# Patient Record
Sex: FEMALE | Race: WHITE | NOT HISPANIC OR LATINO | Employment: FULL TIME | URBAN - METROPOLITAN AREA
[De-identification: names, ages, dates, MRNs, and addresses within clinical notes are randomized per-mention and may not be internally consistent; named-entity substitution may affect disease eponyms.]

---

## 2022-12-10 ENCOUNTER — HOSPITAL ENCOUNTER (EMERGENCY)
Facility: HOSPITAL | Age: 42
Discharge: HOME | End: 2022-12-11
Attending: EMERGENCY MEDICINE | Admitting: EMERGENCY MEDICINE

## 2022-12-10 ENCOUNTER — APPOINTMENT (OUTPATIENT)
Dept: GENERAL RADIOLOGY | Facility: HOSPITAL | Age: 42
End: 2022-12-10

## 2022-12-10 ENCOUNTER — APPOINTMENT (OUTPATIENT)
Dept: CT IMAGING | Facility: HOSPITAL | Age: 42
End: 2022-12-10

## 2022-12-10 DIAGNOSIS — K30 ACID INDIGESTION: ICD-10-CM

## 2022-12-10 DIAGNOSIS — R11.0 NAUSEA: ICD-10-CM

## 2022-12-10 DIAGNOSIS — R13.19 ESOPHAGEAL DYSPHAGIA: ICD-10-CM

## 2022-12-10 DIAGNOSIS — K21.9 GASTROESOPHAGEAL REFLUX DISEASE, UNSPECIFIED WHETHER ESOPHAGITIS PRESENT: ICD-10-CM

## 2022-12-10 DIAGNOSIS — Z72.0 TOBACCO ABUSE: ICD-10-CM

## 2022-12-10 DIAGNOSIS — R10.10 UPPER ABDOMINAL PAIN: Primary | ICD-10-CM

## 2022-12-10 LAB
ALBUMIN SERPL-MCNC: 4.2 G/DL (ref 3.5–5.2)
ALBUMIN/GLOB SERPL: 1.5 G/DL
ALP SERPL-CCNC: 55 U/L (ref 39–117)
ALT SERPL W P-5'-P-CCNC: 11 U/L (ref 1–33)
ANION GAP SERPL CALCULATED.3IONS-SCNC: 13 MMOL/L (ref 5–15)
AST SERPL-CCNC: 15 U/L (ref 1–32)
B-HCG UR QL: NEGATIVE
BACTERIA UR QL AUTO: NORMAL /HPF
BASOPHILS # BLD AUTO: 0.04 10*3/MM3 (ref 0–0.2)
BASOPHILS NFR BLD AUTO: 0.5 % (ref 0–1.5)
BILIRUB SERPL-MCNC: 0.4 MG/DL (ref 0–1.2)
BILIRUB UR QL STRIP: NEGATIVE
BUN SERPL-MCNC: 7 MG/DL (ref 6–20)
BUN/CREAT SERPL: 12.7 (ref 7–25)
CALCIUM SPEC-SCNC: 9.5 MG/DL (ref 8.6–10.5)
CHLORIDE SERPL-SCNC: 103 MMOL/L (ref 98–107)
CLARITY UR: ABNORMAL
CO2 SERPL-SCNC: 24 MMOL/L (ref 22–29)
COLOR UR: YELLOW
CREAT SERPL-MCNC: 0.55 MG/DL (ref 0.57–1)
DEPRECATED RDW RBC AUTO: 40.3 FL (ref 37–54)
EGFRCR SERPLBLD CKD-EPI 2021: 118.3 ML/MIN/1.73
EOSINOPHIL # BLD AUTO: 0.05 10*3/MM3 (ref 0–0.4)
EOSINOPHIL NFR BLD AUTO: 0.7 % (ref 0.3–6.2)
ERYTHROCYTE [DISTWIDTH] IN BLOOD BY AUTOMATED COUNT: 11.7 % (ref 12.3–15.4)
FLUAV RNA RESP QL NAA+PROBE: NOT DETECTED
FLUBV RNA RESP QL NAA+PROBE: NOT DETECTED
GLOBULIN UR ELPH-MCNC: 2.8 GM/DL
GLUCOSE SERPL-MCNC: 94 MG/DL (ref 65–99)
GLUCOSE UR STRIP-MCNC: NEGATIVE MG/DL
HCT VFR BLD AUTO: 38.9 % (ref 34–46.6)
HGB BLD-MCNC: 13.2 G/DL (ref 12–15.9)
HGB UR QL STRIP.AUTO: NEGATIVE
HYALINE CASTS UR QL AUTO: NORMAL /LPF
IMM GRANULOCYTES # BLD AUTO: 0.02 10*3/MM3 (ref 0–0.05)
IMM GRANULOCYTES NFR BLD AUTO: 0.3 % (ref 0–0.5)
KETONES UR QL STRIP: ABNORMAL
LEUKOCYTE ESTERASE UR QL STRIP.AUTO: NEGATIVE
LIPASE SERPL-CCNC: 23 U/L (ref 13–60)
LYMPHOCYTES # BLD AUTO: 1.95 10*3/MM3 (ref 0.7–3.1)
LYMPHOCYTES NFR BLD AUTO: 26.4 % (ref 19.6–45.3)
MCH RBC QN AUTO: 32.3 PG (ref 26.6–33)
MCHC RBC AUTO-ENTMCNC: 33.9 G/DL (ref 31.5–35.7)
MCV RBC AUTO: 95.1 FL (ref 79–97)
MONOCYTES # BLD AUTO: 0.59 10*3/MM3 (ref 0.1–0.9)
MONOCYTES NFR BLD AUTO: 8 % (ref 5–12)
NEUTROPHILS NFR BLD AUTO: 4.73 10*3/MM3 (ref 1.7–7)
NEUTROPHILS NFR BLD AUTO: 64.1 % (ref 42.7–76)
NITRITE UR QL STRIP: NEGATIVE
NRBC BLD AUTO-RTO: 0 /100 WBC (ref 0–0.2)
PH UR STRIP.AUTO: 5.5 [PH] (ref 5–8)
PLATELET # BLD AUTO: 160 10*3/MM3 (ref 140–450)
PMV BLD AUTO: 11.1 FL (ref 6–12)
POTASSIUM SERPL-SCNC: 3.7 MMOL/L (ref 3.5–5.2)
PROT SERPL-MCNC: 7 G/DL (ref 6–8.5)
PROT UR QL STRIP: NEGATIVE
RBC # BLD AUTO: 4.09 10*6/MM3 (ref 3.77–5.28)
RBC # UR STRIP: NORMAL /HPF
REF LAB TEST METHOD: NORMAL
SARS-COV-2 RNA RESP QL NAA+PROBE: NOT DETECTED
SODIUM SERPL-SCNC: 140 MMOL/L (ref 136–145)
SP GR UR STRIP: 1.01 (ref 1–1.03)
SQUAMOUS #/AREA URNS HPF: NORMAL /HPF
TROPONIN T SERPL-MCNC: <0.01 NG/ML (ref 0–0.03)
UROBILINOGEN UR QL STRIP: ABNORMAL
WBC # UR STRIP: NORMAL /HPF
WBC NRBC COR # BLD: 7.38 10*3/MM3 (ref 3.4–10.8)

## 2022-12-10 PROCEDURE — 81001 URINALYSIS AUTO W/SCOPE: CPT | Performed by: PHYSICIAN ASSISTANT

## 2022-12-10 PROCEDURE — 83690 ASSAY OF LIPASE: CPT | Performed by: PHYSICIAN ASSISTANT

## 2022-12-10 PROCEDURE — 99283 EMERGENCY DEPT VISIT LOW MDM: CPT

## 2022-12-10 PROCEDURE — 25010000002 IOPAMIDOL 61 % SOLUTION: Performed by: EMERGENCY MEDICINE

## 2022-12-10 PROCEDURE — 74177 CT ABD & PELVIS W/CONTRAST: CPT

## 2022-12-10 PROCEDURE — 80053 COMPREHEN METABOLIC PANEL: CPT | Performed by: PHYSICIAN ASSISTANT

## 2022-12-10 PROCEDURE — 87636 SARSCOV2 & INF A&B AMP PRB: CPT | Performed by: PHYSICIAN ASSISTANT

## 2022-12-10 PROCEDURE — 86677 HELICOBACTER PYLORI ANTIBODY: CPT | Performed by: PHYSICIAN ASSISTANT

## 2022-12-10 PROCEDURE — 85025 COMPLETE CBC W/AUTO DIFF WBC: CPT | Performed by: PHYSICIAN ASSISTANT

## 2022-12-10 PROCEDURE — 71045 X-RAY EXAM CHEST 1 VIEW: CPT

## 2022-12-10 PROCEDURE — 81025 URINE PREGNANCY TEST: CPT | Performed by: EMERGENCY MEDICINE

## 2022-12-10 PROCEDURE — 96374 THER/PROPH/DIAG INJ IV PUSH: CPT

## 2022-12-10 PROCEDURE — 84484 ASSAY OF TROPONIN QUANT: CPT | Performed by: PHYSICIAN ASSISTANT

## 2022-12-10 PROCEDURE — 93005 ELECTROCARDIOGRAM TRACING: CPT | Performed by: PHYSICIAN ASSISTANT

## 2022-12-10 RX ORDER — SODIUM CHLORIDE 0.9 % (FLUSH) 0.9 %
10 SYRINGE (ML) INJECTION AS NEEDED
Status: DISCONTINUED | OUTPATIENT
Start: 2022-12-10 | End: 2022-12-11 | Stop reason: HOSPADM

## 2022-12-10 RX ORDER — ALUMINA, MAGNESIA, AND SIMETHICONE 2400; 2400; 240 MG/30ML; MG/30ML; MG/30ML
15 SUSPENSION ORAL ONCE
Status: COMPLETED | OUTPATIENT
Start: 2022-12-10 | End: 2022-12-10

## 2022-12-10 RX ORDER — PANTOPRAZOLE SODIUM 40 MG/10ML
40 INJECTION, POWDER, LYOPHILIZED, FOR SOLUTION INTRAVENOUS ONCE
Status: COMPLETED | OUTPATIENT
Start: 2022-12-10 | End: 2022-12-10

## 2022-12-10 RX ORDER — LIDOCAINE HYDROCHLORIDE 20 MG/ML
15 SOLUTION OROPHARYNGEAL ONCE
Status: COMPLETED | OUTPATIENT
Start: 2022-12-10 | End: 2022-12-10

## 2022-12-10 RX ADMIN — PANTOPRAZOLE SODIUM 40 MG: 40 INJECTION, POWDER, FOR SOLUTION INTRAVENOUS at 21:59

## 2022-12-10 RX ADMIN — IOPAMIDOL 85 ML: 612 INJECTION, SOLUTION INTRAVENOUS at 21:31

## 2022-12-10 RX ADMIN — LIDOCAINE HYDROCHLORIDE 15 ML: 20 SOLUTION ORAL at 22:14

## 2022-12-10 RX ADMIN — ALUMINUM HYDROXIDE, MAGNESIUM HYDROXIDE, AND DIMETHICONE 15 ML: 400; 400; 40 SUSPENSION ORAL at 22:14

## 2022-12-11 VITALS
DIASTOLIC BLOOD PRESSURE: 70 MMHG | HEART RATE: 69 BPM | RESPIRATION RATE: 20 BRPM | OXYGEN SATURATION: 96 % | SYSTOLIC BLOOD PRESSURE: 108 MMHG | BODY MASS INDEX: 22.15 KG/M2 | HEIGHT: 63 IN | TEMPERATURE: 97.9 F | WEIGHT: 125 LBS

## 2022-12-11 NOTE — DISCHARGE INSTRUCTIONS
ER work-up is reassuring.  EKG and troponin were normal.  CBC, chemistries, urinalysis were also within normal limits.  H. pylori IgM studies are in process.  CT of the abdomen/pelvis with contrast revealed no acute process.  Patient needs to continue with Nexium, Pepcid, and Carafate as prescribed.  Follow-up next week with GI, Dr. Alanis, at [facility] as scheduled.  Avoid greasy, spicy, or fatty foods.  Avoid NSAIDs, alcohol.  Return to the ER if worsening symptoms.  Strongly recommend tobacco cessation.  Recommend GI soft diet to prevent any further difficulty swallowing.

## 2022-12-11 NOTE — ED PROVIDER NOTES
"Subjective   History of Present Illness  This is a 42-year-old female that presents the ER with recurrent epigastric and substernal burning, pressure, and throbbing for the last 3 months.  Patient says she is having \"choking\" episodes almost on a daily basis with eating.  She says that she was eating cauliflower and it got caught.  She was able to allow the candy to resolve.  She continues to have pressure, indigestion, discomfort, increased belching.  She was seen and evaluated at [facility] ER but says that they did not treat her well.  She has GI follow-up at [facility] next Thursday on 12/15/2022.  Patient reports nausea but says that she is hungry at the same time.  She denies any vomiting.  She reports 1 loose stool yesterday but has not had a bowel movement today.  Patient has never had EGD or colonoscopy.  Patient reports previous history of frequent NSAID use but says that she has not taken NSAIDs in several months.  She denies any alcohol use.  She reports smoking half pack of cigarettes per day.    History provided by:  Patient  Abdominal Pain  Pain location:  Epigastric  Pain quality: burning, pressure and throbbing    Pain radiates to:  Chest (SS chest)  Duration: 3 months.  Timing: Occuring at least once daily.  Chronicity:  Chronic  Context: not alcohol use    Context comment:  Pt having recurrent \"choking\" episodes almost on a daily basis with eating.  She reports episodes started in 9/22.  Last episode was 5 days ago eating cauliflower.  No prior EGD.  Pt is nauseated but hungry at the same time.  Just changed to Nexium PPI.  Relieved by:  Nothing  Exacerbated by: eating almost anything.  Ineffective treatments: Nexium and Pepcid AC.  Associated symptoms: chest pain (SS CP/pressure.), diarrhea (1 loose stool yesterday.) and nausea    Associated symptoms: no anorexia, no belching, no chills, no constipation, no cough, no dysuria, no fatigue, no fever, no flatus, no hematemesis, no " hematochezia, no hematuria, no shortness of breath, no sore throat, no vaginal bleeding, no vaginal discharge and no vomiting    Risk factors: no alcohol abuse    Risk factors comment:  No previous EGD or colonoscopy.  Patient has close follow-up with GI in [place] next Thursday.  Patient used to take increased amounts of NSAIDs, but she has not taken any NSAIDs in several months.      Review of Systems   Constitutional: Positive for appetite change. Negative for activity change, chills, fatigue and fever.   HENT: Negative.  Negative for sore throat.    Respiratory: Negative.  Negative for cough, chest tightness and shortness of breath.         Patient smokes half pack of cigarettes per day.   Cardiovascular: Positive for chest pain (SS CP/pressure.). Negative for palpitations and leg swelling.   Gastrointestinal: Positive for abdominal pain, diarrhea (1 loose stool yesterday.) and nausea. Negative for anorexia, constipation, flatus, hematemesis, hematochezia and vomiting.        Increased indigestion.  Increased belching.   Genitourinary: Negative.  Negative for dysuria, flank pain, frequency, hematuria, urgency, vaginal bleeding and vaginal discharge.        LMP: 2 weeks ago.     Musculoskeletal: Negative.  Negative for back pain.   Neurological: Negative.  Negative for dizziness, light-headedness and headaches.   All other systems reviewed and are negative.      No past medical history on file.    Allergies   Allergen Reactions   • Dilantin [Phenytoin] Hives       No past surgical history on file.    No family history on file.    Social History     Socioeconomic History   • Marital status: Single           Objective   Physical Exam  Vitals and nursing note reviewed.   Constitutional:       General: She is not in acute distress.     Appearance: Normal appearance. She is not ill-appearing, toxic-appearing or diaphoretic.      Comments: Thin build.  Strong tobacco odor noted.  Nontoxic.   HENT:      Head:  Normocephalic and atraumatic.      Nose: Nose normal.      Mouth/Throat:      Mouth: Mucous membranes are moist.      Pharynx: Oropharynx is clear.   Eyes:      Extraocular Movements: Extraocular movements intact.      Conjunctiva/sclera: Conjunctivae normal.      Pupils: Pupils are equal, round, and reactive to light.   Cardiovascular:      Rate and Rhythm: Normal rate and regular rhythm.  No extrasystoles are present.     Pulses: Normal pulses.      Heart sounds: Normal heart sounds.   Pulmonary:      Effort: Pulmonary effort is normal.      Breath sounds: Normal breath sounds.      Comments: Lungs are clear to auscultation bilaterally  Abdominal:      General: Bowel sounds are normal. There is no distension.      Palpations: Abdomen is soft.      Tenderness: There is abdominal tenderness in the epigastric area. There is no right CVA tenderness, left CVA tenderness, guarding or rebound.      Comments: Abdomen soft without distention.  Active bowel sounds in all 4 quadrants.  Moderate epigastric tenderness and mild substernal chest wall tenderness.  No rebound or guarding.  No flank or CVA tenderness.   Musculoskeletal:         General: Normal range of motion.      Cervical back: Normal range of motion and neck supple.      Right lower leg: No edema.      Left lower leg: No edema.   Skin:     General: Skin is warm and dry.   Neurological:      General: No focal deficit present.      Mental Status: She is alert.         Procedures           ED Course  ED Course as of 12/11/22 0034   Sun Dec 11, 2022   0032 EKG showed sinus rhythm.  No acute ST-T wave changes consistent with ischemia.  Chest x-ray showed no acute cardiopulmonary process.  CT of the abdomen/pelvis with contrast revealed no acute infectious or inflammatory process.  CBC, chemistries, and urinalysis were also within normal limits.  Patient tested negative for COVID-19 and influenza.  Urine pregnancy is negative.  Troponin was less than 0.010.  I ordered  H. pylori IgM, which is a send out study.  We gave IV Protonix as well as GI cocktail.  Patient has close GI follow-up with Dr. Alanis next week at [facility].  Recommend EGD in the near future.  Recommend GI soft diet.  Differential diagnoses includes GERD, H. pylori, or esophageal stricture.  Patient not having any difficulty handling secretions or drinking fluids.  She is ready for discharge to home. [FC]      ED Course User Index  [FC] Sonia Conti, DALE           Recent Results (from the past 24 hour(s))   Comprehensive Metabolic Panel    Collection Time: 12/10/22  7:53 PM    Specimen: Blood   Result Value Ref Range    Glucose 94 65 - 99 mg/dL    BUN 7 6 - 20 mg/dL    Creatinine 0.55 (L) 0.57 - 1.00 mg/dL    Sodium 140 136 - 145 mmol/L    Potassium 3.7 3.5 - 5.2 mmol/L    Chloride 103 98 - 107 mmol/L    CO2 24.0 22.0 - 29.0 mmol/L    Calcium 9.5 8.6 - 10.5 mg/dL    Total Protein 7.0 6.0 - 8.5 g/dL    Albumin 4.20 3.50 - 5.20 g/dL    ALT (SGPT) 11 1 - 33 U/L    AST (SGOT) 15 1 - 32 U/L    Alkaline Phosphatase 55 39 - 117 U/L    Total Bilirubin 0.4 0.0 - 1.2 mg/dL    Globulin 2.8 gm/dL    A/G Ratio 1.5 g/dL    BUN/Creatinine Ratio 12.7 7.0 - 25.0    Anion Gap 13.0 5.0 - 15.0 mmol/L    eGFR 118.3 >60.0 mL/min/1.73   Lipase    Collection Time: 12/10/22  7:53 PM    Specimen: Blood   Result Value Ref Range    Lipase 23 13 - 60 U/L   Urinalysis With Culture If Indicated - Urine, Clean Catch    Collection Time: 12/10/22  7:53 PM    Specimen: Urine, Clean Catch   Result Value Ref Range    Color, UA Yellow Yellow, Straw    Appearance, UA Cloudy (A) Clear    pH, UA 5.5 5.0 - 8.0    Specific Gravity, UA 1.008 1.001 - 1.030    Glucose, UA Negative Negative    Ketones, UA Trace (A) Negative    Bilirubin, UA Negative Negative    Blood, UA Negative Negative    Protein, UA Negative Negative    Leuk Esterase, UA Negative Negative    Nitrite, UA Negative Negative    Urobilinogen, UA 0.2 E.U./dL 0.2 - 1.0 E.U./dL    Troponin    Collection Time: 12/10/22  7:53 PM    Specimen: Blood   Result Value Ref Range    Troponin T <0.010 0.000 - 0.030 ng/mL   CBC Auto Differential    Collection Time: 12/10/22  7:53 PM    Specimen: Blood   Result Value Ref Range    WBC 7.38 3.40 - 10.80 10*3/mm3    RBC 4.09 3.77 - 5.28 10*6/mm3    Hemoglobin 13.2 12.0 - 15.9 g/dL    Hematocrit 38.9 34.0 - 46.6 %    MCV 95.1 79.0 - 97.0 fL    MCH 32.3 26.6 - 33.0 pg    MCHC 33.9 31.5 - 35.7 g/dL    RDW 11.7 (L) 12.3 - 15.4 %    RDW-SD 40.3 37.0 - 54.0 fl    MPV 11.1 6.0 - 12.0 fL    Platelets 160 140 - 450 10*3/mm3    Neutrophil % 64.1 42.7 - 76.0 %    Lymphocyte % 26.4 19.6 - 45.3 %    Monocyte % 8.0 5.0 - 12.0 %    Eosinophil % 0.7 0.3 - 6.2 %    Basophil % 0.5 0.0 - 1.5 %    Immature Grans % 0.3 0.0 - 0.5 %    Neutrophils, Absolute 4.73 1.70 - 7.00 10*3/mm3    Lymphocytes, Absolute 1.95 0.70 - 3.10 10*3/mm3    Monocytes, Absolute 0.59 0.10 - 0.90 10*3/mm3    Eosinophils, Absolute 0.05 0.00 - 0.40 10*3/mm3    Basophils, Absolute 0.04 0.00 - 0.20 10*3/mm3    Immature Grans, Absolute 0.02 0.00 - 0.05 10*3/mm3    nRBC 0.0 0.0 - 0.2 /100 WBC   Urinalysis, Microscopic Only - Urine, Clean Catch    Collection Time: 12/10/22  7:53 PM    Specimen: Urine, Clean Catch   Result Value Ref Range    RBC, UA 0-2 None Seen, 0-2 /HPF    WBC, UA 0-2 None Seen, 0-2 /HPF    Bacteria, UA Trace None Seen, Trace /HPF    Squamous Epithelial Cells, UA 0-2 None Seen, 0-2 /HPF    Hyaline Casts, UA 0-6 0 - 6 /LPF    Methodology Manual Light Microscopy    Pregnancy, Urine - Urine, Clean Catch    Collection Time: 12/10/22  7:53 PM    Specimen: Urine, Clean Catch   Result Value Ref Range    HCG, Urine QL Negative Negative   ECG 12 Lead Chest Pain    Collection Time: 12/10/22  8:29 PM   Result Value Ref Range    QT Interval 406 ms    QTC Interval 425 ms   COVID-19 and FLU A/B PCR - Swab, Nasopharynx    Collection Time: 12/10/22 10:29 PM    Specimen: Nasopharynx; Swab   Result Value Ref  Range    COVID19 Not Detected Not Detected - Ref. Range    Influenza A PCR Not Detected Not Detected    Influenza B PCR Not Detected Not Detected     Note: In addition to lab results from this visit, the labs listed above may include labs taken at another facility or during a different encounter within the last 24 hours. Please correlate lab times with ED admission and discharge times for further clarification of the services performed during this visit.    CT Abdomen Pelvis With Contrast   Final Result      1.  No acute abdominopelvic abnormality.                   Electronically signed by:  Sean Malik M.D.     12/10/2022 8:21 PM Mountain Time      XR Chest 1 View   Final Result   Impression:   No evidence of an acute pleural or pulmonary parenchymal abnormality.      Electronically signed by:  Ezio Gomez M.D.     12/10/2022 6:26 PM Mountain Time        Vitals:    12/10/22 2300 12/10/22 2330 12/11/22 0000 12/11/22 0030   BP: 110/59 108/63 122/68 108/70   BP Location:       Patient Position:       Pulse: 71 76 71 69   Resp:       Temp:       TempSrc:       SpO2: 97% 96% 96% 96%   Weight:       Height:         Medications   sodium chloride 0.9 % flush 10 mL (has no administration in time range)   pantoprazole (PROTONIX) injection 40 mg (40 mg Intravenous Given 12/10/22 2159)   iopamidol (ISOVUE-300) 61 % injection 100 mL (85 mL Intravenous Given 12/10/22 2131)   aluminum-magnesium hydroxide-simethicone (MAALOX MAX) 400-400-40 MG/5ML suspension 15 mL (15 mL Oral Given 12/10/22 2214)   Lidocaine Viscous HCl (XYLOCAINE) 2 % solution 15 mL (15 mL Mouth/Throat Given 12/10/22 2214)     ECG/EMG Results (last 24 hours)     ** No results found for the last 24 hours. **        ECG 12 Lead Chest Pain   Preliminary Result   Test Reason : Chest Pain   Blood Pressure :   */*   mmHG   Vent. Rate :  66 BPM     Atrial Rate :  66 BPM      P-R Int : 130 ms          QRS Dur :  90 ms       QT Int : 406 ms       P-R-T Axes :  53   82  62 degrees      QTc Int : 425 ms      Normal sinus rhythm   Normal ECG   No previous ECGs available      Referred By: EDMD           Confirmed By:                                           MDM    Final diagnoses:   Upper abdominal pain   Gastroesophageal reflux disease, unspecified whether esophagitis present   Nausea   Acid indigestion   Tobacco abuse   Esophageal dysphagia       ED Disposition  ED Disposition     ED Disposition   Discharge    Condition   Stable    Comment   --             Dr. Lb Alanis, GI, as scheduled    Call in 2 days  Patient is scheduled for GI follow-up next week at [facility]  [facility] [address] [phone]    If symptoms worsen         Medication List      No changes were made to your prescriptions during this visit.          Sonia Conti PA-C  12/11/22 0034

## 2022-12-13 LAB — H PYLORI IGM SER-ACNC: <9 UNITS (ref 0–8.9)

## 2022-12-23 LAB
QT INTERVAL: 406 MS
QTC INTERVAL: 425 MS